# Patient Record
Sex: FEMALE | Race: OTHER | NOT HISPANIC OR LATINO
[De-identification: names, ages, dates, MRNs, and addresses within clinical notes are randomized per-mention and may not be internally consistent; named-entity substitution may affect disease eponyms.]

---

## 2017-09-07 ENCOUNTER — APPOINTMENT (OUTPATIENT)
Dept: UROLOGY | Facility: CLINIC | Age: 72
End: 2017-09-07

## 2017-09-21 ENCOUNTER — APPOINTMENT (OUTPATIENT)
Dept: UROLOGY | Facility: CLINIC | Age: 72
End: 2017-09-21

## 2017-09-28 ENCOUNTER — APPOINTMENT (OUTPATIENT)
Dept: UROLOGY | Facility: CLINIC | Age: 72
End: 2017-09-28

## 2017-10-12 ENCOUNTER — APPOINTMENT (OUTPATIENT)
Dept: UROLOGY | Facility: CLINIC | Age: 72
End: 2017-10-12

## 2017-10-19 ENCOUNTER — APPOINTMENT (OUTPATIENT)
Dept: UROLOGY | Facility: CLINIC | Age: 72
End: 2017-10-19
Payer: MEDICARE

## 2017-10-19 ENCOUNTER — OUTPATIENT (OUTPATIENT)
Dept: OUTPATIENT SERVICES | Facility: HOSPITAL | Age: 72
LOS: 1 days | End: 2017-10-19
Payer: COMMERCIAL

## 2017-10-19 VITALS — RESPIRATION RATE: 14 BRPM | SYSTOLIC BLOOD PRESSURE: 166 MMHG | HEART RATE: 51 BPM | DIASTOLIC BLOOD PRESSURE: 79 MMHG

## 2017-10-19 VITALS — SYSTOLIC BLOOD PRESSURE: 153 MMHG | DIASTOLIC BLOOD PRESSURE: 88 MMHG | RESPIRATION RATE: 14 BRPM | HEART RATE: 51 BPM

## 2017-10-19 DIAGNOSIS — R35.0 FREQUENCY OF MICTURITION: ICD-10-CM

## 2017-10-19 PROCEDURE — 52000 CYSTOURETHROSCOPY: CPT

## 2017-10-19 PROCEDURE — 99214 OFFICE O/P EST MOD 30 MIN: CPT | Mod: 25

## 2017-10-27 LAB — CORE LAB FLUID CYTOLOGY: NORMAL

## 2017-10-31 ENCOUNTER — CHART COPY (OUTPATIENT)
Age: 72
End: 2017-10-31

## 2017-10-31 DIAGNOSIS — N39.0 URINARY TRACT INFECTION, SITE NOT SPECIFIED: ICD-10-CM

## 2017-10-31 DIAGNOSIS — C67.9 MALIGNANT NEOPLASM OF BLADDER, UNSPECIFIED: ICD-10-CM

## 2017-10-31 DIAGNOSIS — R31.29 OTHER MICROSCOPIC HEMATURIA: ICD-10-CM

## 2017-10-31 LAB
APPEARANCE: CLEAR
BACTERIA: NEGATIVE
BILIRUBIN URINE: NEGATIVE
BLOOD URINE: ABNORMAL
COLOR: YELLOW
GLUCOSE QUALITATIVE U: NEGATIVE MG/DL
HYALINE CASTS: 1 /LPF
KETONES URINE: NEGATIVE
LEUKOCYTE ESTERASE URINE: NEGATIVE
MICROSCOPIC-UA: NORMAL
NITRITE URINE: NEGATIVE
PH URINE: 5.5
PROTEIN URINE: NEGATIVE MG/DL
RED BLOOD CELLS URINE: 11 /HPF
SPECIFIC GRAVITY URINE: 1.02
SQUAMOUS EPITHELIAL CELLS: 3 /HPF
UROBILINOGEN URINE: NEGATIVE MG/DL
WHITE BLOOD CELLS URINE: 1 /HPF

## 2017-11-08 ENCOUNTER — FORM ENCOUNTER (OUTPATIENT)
Age: 72
End: 2017-11-08

## 2017-11-09 ENCOUNTER — APPOINTMENT (OUTPATIENT)
Dept: CT IMAGING | Facility: IMAGING CENTER | Age: 72
End: 2017-11-09
Payer: MEDICARE

## 2017-11-09 ENCOUNTER — OUTPATIENT (OUTPATIENT)
Dept: OUTPATIENT SERVICES | Facility: HOSPITAL | Age: 72
LOS: 1 days | End: 2017-11-09
Payer: COMMERCIAL

## 2017-11-09 DIAGNOSIS — R31.29 OTHER MICROSCOPIC HEMATURIA: ICD-10-CM

## 2017-11-09 DIAGNOSIS — C67.9 MALIGNANT NEOPLASM OF BLADDER, UNSPECIFIED: ICD-10-CM

## 2017-11-09 PROCEDURE — 74178 CT ABD&PLV WO CNTR FLWD CNTR: CPT | Mod: 26

## 2017-11-09 PROCEDURE — 74178 CT ABD&PLV WO CNTR FLWD CNTR: CPT

## 2017-11-09 PROCEDURE — 82565 ASSAY OF CREATININE: CPT

## 2018-10-18 ENCOUNTER — APPOINTMENT (OUTPATIENT)
Dept: UROLOGY | Facility: CLINIC | Age: 73
End: 2018-10-18
Payer: MEDICARE

## 2018-10-18 ENCOUNTER — OUTPATIENT (OUTPATIENT)
Dept: OUTPATIENT SERVICES | Facility: HOSPITAL | Age: 73
LOS: 1 days | End: 2018-10-18
Payer: COMMERCIAL

## 2018-10-18 VITALS
HEART RATE: 48 BPM | WEIGHT: 180 LBS | SYSTOLIC BLOOD PRESSURE: 179 MMHG | DIASTOLIC BLOOD PRESSURE: 64 MMHG | BODY MASS INDEX: 30.73 KG/M2 | TEMPERATURE: 97.8 F | RESPIRATION RATE: 16 BRPM | HEIGHT: 64 IN

## 2018-10-18 DIAGNOSIS — R35.0 FREQUENCY OF MICTURITION: ICD-10-CM

## 2018-10-18 PROCEDURE — 99213 OFFICE O/P EST LOW 20 MIN: CPT | Mod: 25

## 2018-10-18 PROCEDURE — 52000 CYSTOURETHROSCOPY: CPT

## 2018-10-19 DIAGNOSIS — C67.9 MALIGNANT NEOPLASM OF BLADDER, UNSPECIFIED: ICD-10-CM

## 2018-10-19 DIAGNOSIS — R31.29 OTHER MICROSCOPIC HEMATURIA: ICD-10-CM

## 2018-10-19 DIAGNOSIS — N39.0 URINARY TRACT INFECTION, SITE NOT SPECIFIED: ICD-10-CM

## 2019-10-17 ENCOUNTER — APPOINTMENT (OUTPATIENT)
Dept: UROLOGY | Facility: CLINIC | Age: 74
End: 2019-10-17

## 2019-10-24 ENCOUNTER — APPOINTMENT (OUTPATIENT)
Dept: UROLOGY | Facility: CLINIC | Age: 74
End: 2019-10-24
Payer: MEDICARE

## 2019-10-24 ENCOUNTER — OUTPATIENT (OUTPATIENT)
Dept: OUTPATIENT SERVICES | Facility: HOSPITAL | Age: 74
LOS: 1 days | End: 2019-10-24
Payer: MEDICARE

## 2019-10-24 VITALS
HEIGHT: 64 IN | RESPIRATION RATE: 16 BRPM | DIASTOLIC BLOOD PRESSURE: 89 MMHG | BODY MASS INDEX: 30.73 KG/M2 | HEART RATE: 54 BPM | SYSTOLIC BLOOD PRESSURE: 162 MMHG | WEIGHT: 180 LBS

## 2019-10-24 DIAGNOSIS — R35.0 FREQUENCY OF MICTURITION: ICD-10-CM

## 2019-10-24 PROCEDURE — 52000 CYSTOURETHROSCOPY: CPT

## 2019-10-24 PROCEDURE — 99214 OFFICE O/P EST MOD 30 MIN: CPT | Mod: 25

## 2019-10-28 NOTE — ASSESSMENT
[FreeTextEntry1] : Mrs Marshall is a 74 year old female presented for follow up of bladder cancer. The patient had a bladder tumor resected in 2010. The patient's cystoscopy 09/10/15 was free of any bladder tumors or stones. The patient's renal ultrasound 09/10/15 visualized kidneys present bilaterally. There were no calculi or other abnormalities visualized. Cystoscopy 09/08/16 found the left and right ureteral orifice in normal position and configuration. There was some squamous metaplasia at the trigone of a normal variant. There were no bladder stones or areas of inflammation. Urinalysis 09/08/16 found 6 RBC/HPF and urine culture found 40,000 GFU/ml of E. coli. The patient was started on cefadroxil 500 mg twice daily. The patient returns today for a cystoscopy.  On cystoscopy 10/19/17 Small PVR. Clear reflux from left  ureteral orifice which is a slit. clear reflux from right ureteral orifice. no bladder inflammation. bladder neck is normal on retroflexion. No bladder stones. 1+trabeculation of the bladder wall. No bladder tumors. No urethral tumors or stones. No inflammatory polyps at the bladder neck. Bladder is appreciable in lower abdomen after procedure. She reported that throughout the day she urinates 1-2 times. At night she reported she wakes up once. The patient denied any gross hematuria, urinary urgency, dysuria os stress incontinence.  \par 10/18/18: The patient returned today for a cystoscopy. Creatinine from 7/10/18 was 0.79 mg/dL. \par \par 10/24/19: Patient presents today for a cystoscopy. It has been 7 years since her last tumor. Her last CT scan of the abdomen and pelvis was on 11/7/17\par \par The patient produced a urine sample which will be sent for urinalysis, urine cytology and urine culture. \par \par Cystoscopy Findings: bladder has normal pale pink mucosa, PVR is small, bladder neck is normal, anterior bladder is normal, left ureteral orifice is a wide slit, wide ureteral orifice is a slit, clear efflux bilaterally, left ureteral orifice remains open, no bladder stones, 1+ trabeculation, no inflammatory polyps at the bladder neck. \par \par The patient took one Bactrim tablet at the time of the procedure and will take one before bed. \par  \par A CT scan and cystoscopy will be completed in 1 year. \par \par She is to follow up in 1 year for a cystoscopy or sooner if clinically indicated.

## 2019-10-28 NOTE — PHYSICAL EXAM
[General Appearance - Well Developed] : well developed [General Appearance - Well Nourished] : well nourished [Normal Appearance] : normal appearance [Well Groomed] : well groomed [General Appearance - In No Acute Distress] : no acute distress [Abdomen Soft] : soft [Abdomen Tenderness] : non-tender [Costovertebral Angle Tenderness] : no ~M costovertebral angle tenderness [Skin Color & Pigmentation] : normal skin color and pigmentation [Edema] : no peripheral edema [] : no respiratory distress [Exaggerated Use Of Accessory Muscles For Inspiration] : no accessory muscle use [Oriented To Time, Place, And Person] : oriented to person, place, and time [Mood] : the mood was normal [Affect] : the affect was normal [Not Anxious] : not anxious [Normal Station and Gait] : the gait and station were normal for the patient's age [No Focal Deficits] : no focal deficits [Cervical Lymph Nodes Enlarged Posterior Bilaterally] : posterior cervical [Cervical Lymph Nodes Enlarged Anterior Bilaterally] : anterior cervical [Supraclavicular Lymph Nodes Enlarged Bilaterally] : supraclavicular [FreeTextEntry1] : mild bradycardia.

## 2019-10-28 NOTE — HISTORY OF PRESENT ILLNESS
[FreeTextEntry1] : Mrs Marshall is a 74 year old female presented for follow up of bladder cancer. The patient had a bladder tumor resected in 2010. The patient's cystoscopy 09/10/15 was free of any bladder tumors or stones. The patient's renal ultrasound 09/10/15 visualized kidneys present bilaterally. There were no calculi or other abnormalities visualized. Cystoscopy 09/08/16 found the left and right ureteral orifice in normal position and configuration. There was some squamous metaplasia at the trigone of a normal variant. There were no bladder stones or areas of inflammation. Urinalysis 09/08/16 found 6 RBC/HPF and urine culture found 40,000 GFU/ml of E. coli. The patient was started on cefadroxil 500 mg twice daily. The patient returns today for a cystoscopy.  On cystoscopy 10/19/17 Small PVR. Clear reflux from left  ureteral orifice which is a slit. clear reflux from right ureteral orifice. no bladder inflammation. bladder neck is normal on retroflexion. No bladder stones. 1+trabeculation of the bladder wall. No bladder tumors. No urethral tumors or stones. No inflammatory polyps at the bladder neck. Bladder is appreciable in lower abdomen after procedure. She reported that throughout the day she urinates 1-2 times. At night she reported she wakes up once. The patient denied any gross hematuria, urinary urgency, dysuria os stress incontinence.  \par 10/18/18: The patient returned today for a cystoscopy. Creatinine from 7/10/18 was 0.79 mg/dL. \par \par 10/24/19: Patient presents today for a cystoscopy. It has been 7 years since her last tumor. Her last CT scan of the abdomen and pelvis was on 11/7/17

## 2019-10-28 NOTE — ADDENDUM
[FreeTextEntry1] : This note was authored by Raymundo Sparks working as scribe for Dr. Milad Quinones. I, Dr. Milad Quinones, have reviewed the content of this note and confirm it is true and accurate. I personally performed the history and physical examination and made all the decisions.\par 10/24/19

## 2019-10-28 NOTE — REVIEW OF SYSTEMS
[Eyesight Problems] : eyesight problems [Feeling Poorly] : not feeling poorly [Constipation] : no constipation

## 2019-10-31 DIAGNOSIS — C66.9 MALIGNANT NEOPLASM OF UNSPECIFIED URETER: ICD-10-CM

## 2019-10-31 DIAGNOSIS — C67.9 MALIGNANT NEOPLASM OF BLADDER, UNSPECIFIED: ICD-10-CM

## 2019-10-31 DIAGNOSIS — R31.29 OTHER MICROSCOPIC HEMATURIA: ICD-10-CM

## 2019-11-12 LAB
APPEARANCE: CLEAR
BACTERIA UR CULT: NORMAL
BACTERIA: NEGATIVE
BILIRUBIN URINE: NEGATIVE
BLOOD URINE: NEGATIVE
COLOR: YELLOW
GLUCOSE QUALITATIVE U: NEGATIVE
HYALINE CASTS: 1 /LPF
KETONES URINE: NEGATIVE
LEUKOCYTE ESTERASE URINE: NEGATIVE
MICROSCOPIC-UA: NORMAL
NITRITE URINE: NEGATIVE
PH URINE: 6
PROTEIN URINE: NEGATIVE
RED BLOOD CELLS URINE: 2 /HPF
SPECIFIC GRAVITY URINE: 1.02
SQUAMOUS EPITHELIAL CELLS: 2 /HPF
URINE CYTOLOGY: NORMAL
UROBILINOGEN URINE: NORMAL
WHITE BLOOD CELLS URINE: 2 /HPF

## 2020-10-15 ENCOUNTER — APPOINTMENT (OUTPATIENT)
Dept: UROLOGY | Facility: CLINIC | Age: 75
End: 2020-10-15
Payer: MEDICARE

## 2020-10-15 ENCOUNTER — OUTPATIENT (OUTPATIENT)
Dept: OUTPATIENT SERVICES | Facility: HOSPITAL | Age: 75
LOS: 1 days | End: 2020-10-15
Payer: MEDICARE

## 2020-10-15 VITALS
HEART RATE: 60 BPM | SYSTOLIC BLOOD PRESSURE: 172 MMHG | DIASTOLIC BLOOD PRESSURE: 75 MMHG | RESPIRATION RATE: 16 BRPM | BODY MASS INDEX: 30.73 KG/M2 | WEIGHT: 180 LBS | HEIGHT: 64 IN

## 2020-10-15 VITALS — TEMPERATURE: 97.7 F

## 2020-10-15 DIAGNOSIS — R35.0 FREQUENCY OF MICTURITION: ICD-10-CM

## 2020-10-15 PROCEDURE — 99214 OFFICE O/P EST MOD 30 MIN: CPT | Mod: 25

## 2020-10-15 PROCEDURE — 52000 CYSTOURETHROSCOPY: CPT

## 2020-10-15 PROCEDURE — 88112 CYTOPATH CELL ENHANCE TECH: CPT | Mod: 26

## 2020-10-15 NOTE — PHYSICAL EXAM
[General Appearance - Well Developed] : well developed [Normal Appearance] : normal appearance [General Appearance - In No Acute Distress] : no acute distress [Abdomen Soft] : soft [Abdomen Tenderness] : non-tender [Skin Color & Pigmentation] : normal skin color and pigmentation [Edema] : no peripheral edema [] : no respiratory distress [Respiration, Rhythm And Depth] : normal respiratory rhythm and effort [Oriented To Time, Place, And Person] : oriented to person, place, and time [Exaggerated Use Of Accessory Muscles For Inspiration] : no accessory muscle use [Affect] : the affect was normal [Mood] : the mood was normal [Not Anxious] : not anxious [No Focal Deficits] : no focal deficits [Normal Station and Gait] : the gait and station were normal for the patient's age

## 2020-10-17 NOTE — ASSESSMENT
[FreeTextEntry1] : Mrs Marshall is a 75 year old female presented for follow up of bladder cancer. The patient had a bladder tumor resected in 2010. The patient's cystoscopy 09/10/15 was free of any bladder tumors or stones. The patient's renal ultrasound 09/10/15 visualized kidneys present bilaterally. There were no calculi or other abnormalities visualized. Cystoscopy 09/08/16 found the left and right ureteral orifice in normal position and configuration. There was some squamous metaplasia at the trigone of a normal variant. There were no bladder stones or areas of inflammation. Urinalysis 09/08/16 found 6 RBC/HPF and urine culture found 40,000 GFU/ml of E. coli. The patient was started on cefadroxil 500 mg twice daily. The patient returns today for a cystoscopy.  On cystoscopy 10/19/17 Small PVR. Clear reflux from left  ureteral orifice which is a slit. clear reflux from right ureteral orifice. no bladder inflammation. bladder neck is normal on retroflexion. No bladder stones. 1+trabeculation of the bladder wall. No bladder tumors. No urethral tumors or stones. No inflammatory polyps at the bladder neck. Bladder is appreciable in lower abdomen after procedure. She reported that throughout the day she urinates 1-2 times. At night she reported she wakes up once. The patient denied any gross hematuria, urinary urgency, dysuria os stress incontinence.  \par 10/18/18: The patient returned today for a cystoscopy. Creatinine from 7/10/18 was 0.79 mg/dL. \par \par 10/24/19: Patient presents today for a cystoscopy. It has been 7 years since her last tumor. Her last CT scan of the abdomen and pelvis was on 11/7/17. Cystoscopy Findings: bladder has normal pale pink mucosa, PVR is small, bladder neck is normal, anterior bladder is normal, left ureteral orifice is a wide slit, wide ureteral orifice is a slit, clear efflux bilaterally, left ureteral orifice remains open, no bladder stones, 1+ trabeculation, no inflammatory polyps at the bladder neck. A CT scan and cystoscopy will be completed in 1 year. She is to follow up in 1 year for a cystoscopy or sooner if clinically indicated. \par \par 10/15/2020: Patient presents today for cystoscopy. On 1/29/2010, patient underwent a transurethral resection of a 3 cm bladder tumor. Pathology demonstrated a noninvasive papillary urothelial carcinoma with focal high grade cancer in a background of extensive low-grade tumor with necrosis and calcifications. No gross hematuria, urinary urgency, or urinary incontinence. Last CT scan in 2017. Last creatinine on 10/9/20 was 0.90. \par \par Cystoscopy findings: Bladder mucosa is normal pale pink. R and L ureteral orifices are slits with normal position on the trigone. Clear efflux seen on the left and right. Bladder is 1+ trabeculated.  No bladder stones or tumors. No inflammatory polyps. No strictures. No urethral stricture, stones, or tumors. \par \par Two tablets of Bactrim are provided. Patient is to take one tablet now and one tablet tonight to limit their risk of infection. \par \par The patient produced a urine sample which will be sent for urinalysis, urine cytology, and urine culture. \par \par Patient will go for CT urogram.

## 2020-10-17 NOTE — HISTORY OF PRESENT ILLNESS
[FreeTextEntry1] : Mrs Marshall is a 74 year old female presented for follow up of bladder cancer. The patient had a bladder tumor resected in 2010. The patient's cystoscopy 09/10/15 was free of any bladder tumors or stones. The patient's renal ultrasound 09/10/15 visualized kidneys present bilaterally. There were no calculi or other abnormalities visualized. Cystoscopy 09/08/16 found the left and right ureteral orifice in normal position and configuration. There was some squamous metaplasia at the trigone of a normal variant. There were no bladder stones or areas of inflammation. Urinalysis 09/08/16 found 6 RBC/HPF and urine culture found 40,000 GFU/ml of E. coli. The patient was started on cefadroxil 500 mg twice daily. The patient returns today for a cystoscopy.  On cystoscopy 10/19/17 Small PVR. Clear reflux from left  ureteral orifice which is a slit. clear reflux from right ureteral orifice. no bladder inflammation. bladder neck is normal on retroflexion. No bladder stones. 1+trabeculation of the bladder wall. No bladder tumors. No urethral tumors or stones. No inflammatory polyps at the bladder neck. Bladder is appreciable in lower abdomen after procedure. She reported that throughout the day she urinates 1-2 times. At night she reported she wakes up once. The patient denied any gross hematuria, urinary urgency, dysuria os stress incontinence.  \par 10/18/18: The patient returned today for a cystoscopy. Creatinine from 7/10/18 was 0.79 mg/dL. \par \par 10/24/19: Patient presents today for a cystoscopy. It has been 7 years since her last tumor. Her last CT scan of the abdomen and pelvis was on 11/7/17\par \par 10/15/2020: Patient presents today for cystoscopy. On 1/29/2010, patient underwent a transurethral resection of a 3 cm bladder tumor. Pathology demonstrated a noninvasive papillary urothelial carcinoma with focal high grade cancer in a background of extensive low-grade tumor with necrosis and calcifications. No gross hematuria, urinary urgency, or urinary incontinence. Last CT scan in 2017. Last creatinine on 10/9/20 was 0.90.

## 2020-10-17 NOTE — ADDENDUM
[FreeTextEntry1] : I, Rehana Virkin, acted solely as a scribe for Dr. Milad Quinones on this date 10/15/2020.\par \par All medical record entries made by the Scribe were at my, Dr. Milad Quinones, direction and personally dictated by me on 10/15/2020. I have reviewed the chart and agree that the record accurately reflects my personal performance of the history, physical exam, assessment and plan.  I have also personally directed, reviewed and agreed with the chart.

## 2020-10-19 LAB
APPEARANCE: CLEAR
BACTERIA UR CULT: NORMAL
BACTERIA: NEGATIVE
BILIRUBIN URINE: NEGATIVE
BLOOD URINE: NEGATIVE
COLOR: NORMAL
GLUCOSE QUALITATIVE U: NEGATIVE
HYALINE CASTS: 0 /LPF
KETONES URINE: NEGATIVE
LEUKOCYTE ESTERASE URINE: ABNORMAL
MICROSCOPIC-UA: NORMAL
NITRITE URINE: NEGATIVE
PH URINE: 6.5
PROTEIN URINE: NORMAL
RED BLOOD CELLS URINE: 1 /HPF
SPECIFIC GRAVITY URINE: 1.02
SQUAMOUS EPITHELIAL CELLS: 4 /HPF
URINE CYTOLOGY: NORMAL
UROBILINOGEN URINE: NORMAL
WHITE BLOOD CELLS URINE: 4 /HPF

## 2020-10-20 DIAGNOSIS — C66.9 MALIGNANT NEOPLASM OF UNSPECIFIED URETER: ICD-10-CM

## 2020-10-20 DIAGNOSIS — C67.9 MALIGNANT NEOPLASM OF BLADDER, UNSPECIFIED: ICD-10-CM

## 2020-10-20 DIAGNOSIS — R31.29 OTHER MICROSCOPIC HEMATURIA: ICD-10-CM

## 2021-10-14 ENCOUNTER — APPOINTMENT (OUTPATIENT)
Dept: UROLOGY | Facility: CLINIC | Age: 76
End: 2021-10-14
Payer: MEDICARE

## 2021-10-14 PROCEDURE — 52000 CYSTOURETHROSCOPY: CPT

## 2021-10-14 NOTE — ASSESSMENT
[FreeTextEntry1] : Mrs Marshall is a 76 year old female presented for follow up of bladder cancer. The patient had a bladder tumor resected in 2010. The patient's cystoscopy 09/10/15 was free of any bladder tumors or stones. The patient's renal ultrasound 09/10/15 visualized kidneys present bilaterally. There were no calculi or other abnormalities visualized. Cystoscopy 09/08/16 found the left and right ureteral orifice in normal position and configuration. There was some squamous metaplasia at the trigone of a normal variant. There were no bladder stones or areas of inflammation. Urinalysis 09/08/16 found 6 RBC/HPF and urine culture found 40,000 GFU/ml of E. coli. The patient was started on cefadroxil 500 mg twice daily. The patient returns today for a cystoscopy.  On cystoscopy 10/19/17 Small PVR. Clear reflux from left  ureteral orifice which is a slit. clear reflux from right ureteral orifice. no bladder inflammation. bladder neck is normal on retroflexion. No bladder stones. 1+trabeculation of the bladder wall. No bladder tumors. No urethral tumors or stones. No inflammatory polyps at the bladder neck. Bladder is appreciable in lower abdomen after procedure. She reported that throughout the day she urinates 1-2 times. At night she reported she wakes up once. The patient denied any gross hematuria, urinary urgency, dysuria os stress incontinence.  \par 10/18/18: The patient returned today for a cystoscopy. Creatinine from 7/10/18 was 0.79 mg/dL. \par \par 10/24/19: Patient presents today for a cystoscopy. It has been 7 years since her last tumor. Her last CT scan of the abdomen and pelvis was on 11/7/17. Cystoscopy Findings: bladder has normal pale pink mucosa, PVR is small, bladder neck is normal, anterior bladder is normal, left ureteral orifice is a wide slit, wide ureteral orifice is a slit, clear efflux bilaterally, left ureteral orifice remains open, no bladder stones, 1+ trabeculation, no inflammatory polyps at the bladder neck. A CT scan and cystoscopy will be completed in 1 year. She is to follow up in 1 year for a cystoscopy or sooner if clinically indicated. \par \par 10/15/2020: Patient presents today for cystoscopy. On 1/29/2010, patient underwent a transurethral resection of a 3 cm bladder tumor. Pathology demonstrated a noninvasive papillary urothelial carcinoma with focal high grade cancer in a background of extensive low-grade tumor with necrosis and calcifications. No gross hematuria, urinary urgency, or urinary incontinence. Last CT scan in 2017. Last creatinine on 10/9/20 was 0.90. \par \par Cystoscopy findings: Bladder mucosa is normal pale pink. R and L ureteral orifices are slits with normal position on the trigone. Clear efflux seen on the left and right. Bladder is 1+ trabeculated.  No bladder stones or tumors. No inflammatory polyps. No strictures. No urethral stricture, stones, or tumors. \par Two tablets of Bactrim are provided. Patient is to take one tablet now and one tablet tonight to limit their risk of infection. \par The patient produced a urine sample which will be sent for urinalysis, urine cytology, and urine culture. \par Patient will go for CT urogram. \par \par 10/14/2021: Patient presents today for a cystoscopy. On 1/29/2010, patient underwent a transurethral resection of a 3 cm bladder tumor. Pathology demonstrated a noninvasive papillary urothelial carcinoma with focal high grade cancer in a background of extensive low-grade tumor with necrosis and calcifications. Patient had a CT Urogram on 10/21/2020 which demonstrated no hydroureteronephrosis. No renal calculi or masses. No evidence of bladder wall enhancement or definite thickening. Multiple hepatic cysts. Extensive diverticulosis. 5 mm left lower lobe lung nodule. Today the patient denies any episodes of gross hematuria, urinary urgency, or frequency. Has not had any recent UTI's. She is doing well and offers no new complaints. Still works in a doctor's office. \par \par cystoscopy: Lidocaine jelly was inserted for numbing and lubrication. Bladder is a normal pale pink mucosa. Upon retroflexing the cystoscope the bladder neck and inferior bladder looks normal. After adding water, the anterior bladder looks normal. No bladder stones or areas of inflammation seen. Left and right ureteral orifices are slits. Squamous metaplasia at the bladder neck posteriorly at the bladder neck. No bladder tumors. No inflammatory polyps at the bladder neck. No urethral strictures, stones, or tumors. The patient was provided two tablets of bactrim to take today and tomorrow. \par \par Patient provided a urine specimen that will be sent for urinalysis, urine culture and urine cytology. \par  \par I informed the patient that she can have surveillance cystoscopies every 2 years now. \par \par Preparation, in person office visit, and coordination of care: 21 minutes.

## 2021-10-14 NOTE — HISTORY OF PRESENT ILLNESS
[FreeTextEntry1] : Mrs Marshall is a 74 year old female presented for follow up of bladder cancer. The patient had a bladder tumor resected in 2010. The patient's cystoscopy 09/10/15 was free of any bladder tumors or stones. The patient's renal ultrasound 09/10/15 visualized kidneys present bilaterally. There were no calculi or other abnormalities visualized. Cystoscopy 09/08/16 found the left and right ureteral orifice in normal position and configuration. There was some squamous metaplasia at the trigone of a normal variant. There were no bladder stones or areas of inflammation. Urinalysis 09/08/16 found 6 RBC/HPF and urine culture found 40,000 GFU/ml of E. coli. The patient was started on cefadroxil 500 mg twice daily. The patient returns today for a cystoscopy.  On cystoscopy 10/19/17 Small PVR. Clear reflux from left  ureteral orifice which is a slit. clear reflux from right ureteral orifice. no bladder inflammation. bladder neck is normal on retroflexion. No bladder stones. 1+trabeculation of the bladder wall. No bladder tumors. No urethral tumors or stones. No inflammatory polyps at the bladder neck. Bladder is appreciable in lower abdomen after procedure. She reported that throughout the day she urinates 1-2 times. At night she reported she wakes up once. The patient denied any gross hematuria, urinary urgency, dysuria os stress incontinence.  \par 10/18/18: The patient returned today for a cystoscopy. Creatinine from 7/10/18 was 0.79 mg/dL. \par \par 10/24/19: Patient presents today for a cystoscopy. It has been 7 years since her last tumor. Her last CT scan of the abdomen and pelvis was on 11/7/17\par \par 10/15/2020: Patient presents today for cystoscopy. On 1/29/2010, patient underwent a transurethral resection of a 3 cm bladder tumor. Pathology demonstrated a noninvasive papillary urothelial carcinoma with focal high grade cancer in a background of extensive low-grade tumor with necrosis and calcifications. No gross hematuria, urinary urgency, or urinary incontinence. Last CT scan in 2017. Last creatinine on 10/9/20 was 0.90. \par \par 10/14/2021: Patient presents today for a cystoscopy. On 1/29/2010, patient underwent a transurethral resection of a 3 cm bladder tumor. Pathology demonstrated a noninvasive papillary urothelial carcinoma with focal high grade cancer in a background of extensive low-grade tumor with necrosis and calcifications. Patient had a CT Urogram on 10/21/2020 which demonstrated no hydroureteronephrosis. No renal calculi or masses. No evidence of bladder wall enhancement or definite thickening. Multiple hepatic cysts. Extensive diverticulosis. 5 mm left lower lobe lung nodule. Today the patient denies any episodes of gross hematuria, urinary urgency, or frequency. Has not had any recent UTI's. She is doing well and offers no new complaints. Still works in a doctor's office.

## 2024-02-02 ENCOUNTER — APPOINTMENT (OUTPATIENT)
Dept: UROLOGY | Facility: CLINIC | Age: 79
End: 2024-02-02

## 2024-03-25 ENCOUNTER — APPOINTMENT (OUTPATIENT)
Dept: UROLOGY | Facility: CLINIC | Age: 79
End: 2024-03-25
Payer: MEDICARE

## 2024-03-25 VITALS
OXYGEN SATURATION: 96 % | HEART RATE: 79 BPM | DIASTOLIC BLOOD PRESSURE: 84 MMHG | TEMPERATURE: 97.8 F | SYSTOLIC BLOOD PRESSURE: 175 MMHG

## 2024-03-25 DIAGNOSIS — C66.9 MALIGNANT NEOPLASM OF UNSPECIFIED URETER: ICD-10-CM

## 2024-03-25 DIAGNOSIS — C67.9 MALIGNANT NEOPLASM OF BLADDER, UNSPECIFIED: ICD-10-CM

## 2024-03-25 DIAGNOSIS — R82.81 PYURIA: ICD-10-CM

## 2024-03-25 DIAGNOSIS — R31.29 OTHER MICROSCOPIC HEMATURIA: ICD-10-CM

## 2024-03-25 PROCEDURE — 52000 CYSTOURETHROSCOPY: CPT

## 2024-03-25 PROCEDURE — 99213 OFFICE O/P EST LOW 20 MIN: CPT | Mod: 25

## 2024-03-25 NOTE — HISTORY OF PRESENT ILLNESS
[FreeTextEntry1] : Mrs Nance is a 78 year old female presented for follow up of bladder cancer. The patient had a bladder tumor resected in 2010. The patient's cystoscopy 09/10/15 was free of any bladder tumors or stones. The patient's renal ultrasound 09/10/15 visualized kidneys present bilaterally. There were no calculi or other abnormalities visualized. Cystoscopy 09/08/16 found the left and right ureteral orifice in normal position and configuration. There was some squamous metaplasia at the trigone of a normal variant. There were no bladder stones or areas of inflammation. Urinalysis 09/08/16 found 6 RBC/HPF and urine culture found 40,000 GFU/ml of E. coli. The patient was started on cefadroxil 500 mg twice daily. The patient returns today for a cystoscopy.  On cystoscopy 10/19/17 Small PVR. Clear reflux from left  ureteral orifice which is a slit. clear reflux from right ureteral orifice. no bladder inflammation. bladder neck is normal on retroflexion. No bladder stones. 1+trabeculation of the bladder wall. No bladder tumors. No urethral tumors or stones. No inflammatory polyps at the bladder neck. Bladder is appreciable in lower abdomen after procedure. She reported that throughout the day she urinates 1-2 times. At night she reported she wakes up once. The patient denied any gross hematuria, urinary urgency, dysuria os stress incontinence.   10/18/18: The patient returned today for a cystoscopy. Creatinine from 7/10/18 was 0.79 mg/dL.   10/24/19: Patient presents today for a cystoscopy. It has been 7 years since her last tumor. Her last CT scan of the abdomen and pelvis was on 11/7/17  10/15/2020: Patient presents today for cystoscopy. On 1/29/2010, patient underwent a transurethral resection of a 3 cm bladder tumor. Pathology demonstrated a noninvasive papillary urothelial carcinoma with focal high grade cancer in a background of extensive low-grade tumor with necrosis and calcifications. No gross hematuria, urinary urgency, or urinary incontinence. Last CT scan in 2017. Last creatinine on 10/9/20 was 0.90.   10/14/2021: Patient presents today for a cystoscopy. On 1/29/2010, patient underwent a transurethral resection of a 3 cm bladder tumor. Pathology demonstrated a noninvasive papillary urothelial carcinoma with focal high grade cancer in a background of extensive low-grade tumor with necrosis and calcifications. Patient had a CT Urogram on 10/21/2020 which demonstrated no hydroureteronephrosis. No renal calculi or masses. No evidence of bladder wall enhancement or definite thickening. Multiple hepatic cysts. Extensive diverticulosis. 5 mm left lower lobe lung nodule. Today the patient denies any episodes of gross hematuria, urinary urgency, or frequency. Has not had any recent UTI's. She is doing well and offers no new complaints. Still works in a doctor's office.   03/25/2024: Ms. OLEG NANCE presents today for a surveillance cystoscopy. On 1/29/2010, patient underwent a transurethral resection of a 3 cm bladder tumor. Pathology demonstrated a noninvasive papillary urothelial carcinoma with focal high grade cancer in a background of extensive low-grade tumor with necrosis and calcifications. Last cysto was 2 years ago. She denies any problems with her urination. She admits some minor stress incontinence when she sneezes. Denies burning and gross hematuria. Denies urinary urgency, pushing, and straining. Nocturia x1. She has not had any infections since last seeing me. She denies hx of smoking. Patient reports that her  of 54 years passed away in October. She decided to retire after losing her . Pt has 3 daughters and 8 grandchildren.

## 2024-03-25 NOTE — ASSESSMENT
[FreeTextEntry1] : Mrs Nance is a 78 year old female presented for follow up of bladder cancer. The patient had a bladder tumor resected in 2010. The patient's cystoscopy 09/10/15 was free of any bladder tumors or stones. The patient's renal ultrasound 09/10/15 visualized kidneys present bilaterally. There were no calculi or other abnormalities visualized. Cystoscopy 09/08/16 found the left and right ureteral orifice in normal position and configuration. There was some squamous metaplasia at the trigone of a normal variant. There were no bladder stones or areas of inflammation. Urinalysis 09/08/16 found 6 RBC/HPF and urine culture found 40,000 GFU/ml of E. coli. The patient was started on cefadroxil 500 mg twice daily. The patient returns today for a cystoscopy.  On cystoscopy 10/19/17 Small PVR. Clear reflux from left  ureteral orifice which is a slit. clear reflux from right ureteral orifice. no bladder inflammation. bladder neck is normal on retroflexion. No bladder stones. 1+trabeculation of the bladder wall. No bladder tumors. No urethral tumors or stones. No inflammatory polyps at the bladder neck. Bladder is appreciable in lower abdomen after procedure. She reported that throughout the day she urinates 1-2 times. At night she reported she wakes up once. The patient denied any gross hematuria, urinary urgency, dysuria os stress incontinence.   10/18/18: The patient returned today for a cystoscopy. Creatinine from 7/10/18 was 0.79 mg/dL.   10/24/19: Patient presents today for a cystoscopy. It has been 7 years since her last tumor. Her last CT scan of the abdomen and pelvis was on 11/7/17. Cystoscopy Findings: bladder has normal pale pink mucosa, PVR is small, bladder neck is normal, anterior bladder is normal, left ureteral orifice is a wide slit, wide ureteral orifice is a slit, clear efflux bilaterally, left ureteral orifice remains open, no bladder stones, 1+ trabeculation, no inflammatory polyps at the bladder neck. A CT scan and cystoscopy will be completed in 1 year. She is to follow up in 1 year for a cystoscopy or sooner if clinically indicated.   10/15/2020: Patient presents today for cystoscopy. On 1/29/2010, patient underwent a transurethral resection of a 3 cm bladder tumor. Pathology demonstrated a noninvasive papillary urothelial carcinoma with focal high grade cancer in a background of extensive low-grade tumor with necrosis and calcifications. No gross hematuria, urinary urgency, or urinary incontinence. Last CT scan in 2017. Last creatinine on 10/9/20 was 0.90.   Cystoscopy findings: Bladder mucosa is normal pale pink. R and L ureteral orifices are slits with normal position on the trigone. Clear efflux seen on the left and right. Bladder is 1+ trabeculated.  No bladder stones or tumors. No inflammatory polyps. No strictures. No urethral stricture, stones, or tumors.  Two tablets of Bactrim are provided. Patient is to take one tablet now and one tablet tonight to limit their risk of infection.  The patient produced a urine sample which will be sent for urinalysis, urine cytology, and urine culture.  Patient will go for CT urogram.   10/14/2021: Patient presents today for a cystoscopy. On 1/29/2010, patient underwent a transurethral resection of a 3 cm bladder tumor. Pathology demonstrated a noninvasive papillary urothelial carcinoma with focal high grade cancer in a background of extensive low-grade tumor with necrosis and calcifications. Patient had a CT Urogram on 10/21/2020 which demonstrated no hydroureteronephrosis. No renal calculi or masses. No evidence of bladder wall enhancement or definite thickening. Multiple hepatic cysts. Extensive diverticulosis. 5 mm left lower lobe lung nodule. Today the patient denies any episodes of gross hematuria, urinary urgency, or frequency. Has not had any recent UTI's. She is doing well and offers no new complaints. Still works in a doctor's office.   cystoscopy: Lidocaine jelly was inserted for numbing and lubrication. Bladder is a normal pale pink mucosa. Upon retroflexing the cystoscope the bladder neck and inferior bladder looks normal. After adding water, the anterior bladder looks normal. No bladder stones or areas of inflammation seen. Left and right ureteral orifices are slits. Squamous metaplasia at the bladder neck posteriorly at the bladder neck. No bladder tumors. No inflammatory polyps at the bladder neck. No urethral strictures, stones, or tumors. The patient was provided two tablets of bactrim to take today and tomorrow.   Patient provided a urine specimen that will be sent for urinalysis, urine culture and urine cytology.  I informed the patient that she can have surveillance cystoscopies every 2 years now.   03/25/2024: Ms. OLEG NANCE presents today for a surveillance cystoscopy. On 1/29/2010, patient underwent a transurethral resection of a 3 cm bladder tumor. Pathology demonstrated a noninvasive papillary urothelial carcinoma with focal high grade cancer in a background of extensive low-grade tumor with necrosis and calcifications. Last cysto was 2 years ago. She denies any problems with her urination. She admits some minor stress incontinence when she sneezes. Denies burning and gross hematuria. Denies urinary urgency, pushing, and straining. Nocturia x1. She has not had any infections since last seeing me. She denies hx of smoking. Patient reports that her  of 54 years passed away in October. She decided to retire after losing her . Pt has 3 daughters and 8 grandchildren.   Cystoscopy: Lidocaine jelly was inserted for local anesthesia. Pt has a rectocele. Retracted it with downward pressure. Bladder mucosa is a normal pale pink color. Scope is retroflexed and bladder neck looks normal. Also has a cystocele. No bladder stones. No inflammation. No trabeculation. Left ureteral orifice is a slit. Efflux is clear. Right ureteral orifice is on the inferior face of the ridge. There are no bladder tumors. There is a little bit of cystitis cystica on left side of the trigone. No urethral strictures, inflammation or stones. The patient took one Bactrim tablet at the time of the procedure and will take one before bed.   The patient produced a urine sample which will be sent for urinalysis, urine cytology, and urine culture.    Patient will RTO in 2 years for next surveillance cystoscopy, sooner if clinically indicated.     Preparation, in person office visit, and coordination of care other than cysto took 25 minutes.

## 2024-03-25 NOTE — PHYSICAL EXAM
[Well Groomed] : well groomed [Normal Appearance] : normal appearance [Edema] : no peripheral edema [General Appearance - In No Acute Distress] : no acute distress [Respiration, Rhythm And Depth] : normal respiratory rhythm and effort [Abdomen Soft] : soft [Exaggerated Use Of Accessory Muscles For Inspiration] : no accessory muscle use [Costovertebral Angle Tenderness] : no ~M costovertebral angle tenderness [Abdomen Tenderness] : non-tender [Normal Station and Gait] : the gait and station were normal for the patient's age [] : no rash [No Focal Deficits] : no focal deficits [Affect] : the affect was normal [Oriented To Time, Place, And Person] : oriented to person, place, and time [Mood] : the mood was normal

## 2024-03-25 NOTE — ADDENDUM
[FreeTextEntry1] : This note was authored by Adina Solano working as a scribe for Dr.Gary Quinones. I, Dr. Milad Quinones have reviewed the content of this note and confirm it is true and accurate. I personally performed the history and physical examination and made all the decisions 03/25/2024

## 2024-03-26 LAB
ALBUMIN SERPL ELPH-MCNC: 4.5 G/DL
ALP BLD-CCNC: 108 U/L
ALT SERPL-CCNC: 16 U/L
ANION GAP SERPL CALC-SCNC: 12 MMOL/L
APPEARANCE: CLEAR
AST SERPL-CCNC: 19 U/L
BACTERIA: NEGATIVE /HPF
BASOPHILS # BLD AUTO: 0.08 K/UL
BASOPHILS NFR BLD AUTO: 1.1 %
BILIRUB SERPL-MCNC: 0.6 MG/DL
BILIRUBIN URINE: NEGATIVE
BLOOD URINE: NEGATIVE
BUN SERPL-MCNC: 15 MG/DL
CALCIUM SERPL-MCNC: 9.5 MG/DL
CAST: 0 /LPF
CHLORIDE SERPL-SCNC: 105 MMOL/L
CO2 SERPL-SCNC: 23 MMOL/L
COLOR: YELLOW
CREAT SERPL-MCNC: 0.75 MG/DL
EGFR: 82 ML/MIN/1.73M2
EOSINOPHIL # BLD AUTO: 0.11 K/UL
EOSINOPHIL NFR BLD AUTO: 1.6 %
EPITHELIAL CELLS: 1 /HPF
GLUCOSE QUALITATIVE U: NEGATIVE MG/DL
GLUCOSE SERPL-MCNC: 104 MG/DL
HCT VFR BLD CALC: 42.7 %
HGB BLD-MCNC: 13.9 G/DL
IMM GRANULOCYTES NFR BLD AUTO: 0.3 %
KETONES URINE: NEGATIVE MG/DL
LEUKOCYTE ESTERASE URINE: ABNORMAL
LYMPHOCYTES # BLD AUTO: 2.12 K/UL
LYMPHOCYTES NFR BLD AUTO: 30.2 %
MAN DIFF?: NORMAL
MCHC RBC-ENTMCNC: 28.1 PG
MCHC RBC-ENTMCNC: 32.6 GM/DL
MCV RBC AUTO: 86.4 FL
MICROSCOPIC-UA: NORMAL
MONOCYTES # BLD AUTO: 0.42 K/UL
MONOCYTES NFR BLD AUTO: 6 %
NEUTROPHILS # BLD AUTO: 4.28 K/UL
NEUTROPHILS NFR BLD AUTO: 60.8 %
NITRITE URINE: NEGATIVE
PH URINE: 5.5
PLATELET # BLD AUTO: 288 K/UL
POTASSIUM SERPL-SCNC: 4 MMOL/L
PROT SERPL-MCNC: 6.9 G/DL
PROTEIN URINE: NEGATIVE MG/DL
RBC # BLD: 4.94 M/UL
RBC # FLD: 14.4 %
RED BLOOD CELLS URINE: 3 /HPF
SODIUM SERPL-SCNC: 140 MMOL/L
SPECIFIC GRAVITY URINE: 1.02
URINE CYTOLOGY: NORMAL
UROBILINOGEN URINE: 0.2 MG/DL
WBC # FLD AUTO: 7.03 K/UL
WHITE BLOOD CELLS URINE: 8 /HPF

## 2024-03-27 LAB — BACTERIA UR CULT: NORMAL

## 2024-03-30 LAB — HLX UV FISH FINAL REPORT: NORMAL
